# Patient Record
(demographics unavailable — no encounter records)

---

## 2024-10-31 NOTE — ASSESSMENT
[FreeTextEntry1] : 79 year old male who presents for a follow-up visit in our pancreas cyst clinic via telehealth.   His PMH includes colon cancer s/p right hemicolectomy Oct 2023, knee replacements, TAVR 7/2023, HTN, CHELSI.  Pancreas cyst incidentally found on CT abdomen in 2023.    Surveillance MRI/MRCP 9/7/24 showing a 1.3 cm pancreatic tail cyst; a 0.7 cm pancreatic body/tail cyst; and a 1.2 cm pancreatic head cyst. 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 The pancreatic duct is within normal limits.  He offers no GI complaints.  Denies abdominal pain or unintentional weight loss. Reports family history of sister with pancreas cancer (age 60's) and mother with lung ca (smoking hx).   Reviewed cyst pathophysiology with the patient.  Also reviewed [facility] Pancreas Cyst [facility] with the patient.  We will continue to follow him in our program for scheduled ongoing surveillance. Plan for repeat MRI in 1 year to assess stability and follow-up in cyst clinic after MRI. He is interested in having genetic testing due to family hx of sister with pancreas cancer. Will make referral.    I discussed symptoms that would raise my concern for malignant transformation including unintended weight loss, jaundice, abdominal/ back pain, and new onset or worsening diabetes. Should these develop, He should call immediately.   Patient verbalized understanding and agrees to plan.   Today, I personally spent 25 minutes in total time including reviewing imaging and studies, discussing treatment regimens, direct face to face time with the patient, patient education and counseling.

## 2024-10-31 NOTE — HISTORY OF PRESENT ILLNESS
[de-identified] : This visit was provided via telehealth using real-time 2-way audio visual technology. The patient, ANGELA CARTER, was located at home [address] [place] [address] at the time of the visit. This provider was located at the clinic in [place], [place] at the time of the visit. The provider, patient participated in the telehealth encounter.  Verbal consent was given Oct 31 2024  2:15PM by the patient.  Mr. ANGELA CARTER is a 79 year old male who presents for a follow-up visit in our pancreas cyst clinic via telehealth.   His PMH includes colon cancer s/p right hemicolectomy Oct 2023, knee replacements, TAVR 7/2023, HTN, CHELSI.  Pancreas cyst incidentally found on CT abdomen in 2023.    He now presents after one year interval surveillance MRI/MRCP performed 9/7/24 with noted findings- A 1.3 cm pancreatic tail cys; a 0.7 cm pancreatic body/tail cyst; and a 1.2 cm pancreatic head cyst. 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 The pancreatic duct is within normal limits. No evidence for pancreatitis.   He offers no GI complaints.  Denies abdominal pain or unintentional weight loss.   Reports family history of sister with pancreas cancer (age 60's) and mother with lung ca (smoking hx).  No genetic testing of patient or family.

## 2024-10-31 NOTE — PHYSICAL EXAM
[FreeTextEntry1] : Physical exam not performed during Telehealth visits.   [Normal] : oriented to person, place and time, with appropriate affect

## 2024-11-27 NOTE — DISCUSSION/SUMMARY
[FreeTextEntry1] : The visit was provided via telehealth using real-time 2-way audio visual technology. The patient, Bereket Hernandez, was located at home in [place], [place] at the time of the visit. The Genetic Counselor, Alicia Hoffman, was located at the medical office located in [place], [place]. The patient and the Genetic Counselor both participated in the telehealth encounter. [employer] student Ana Porter also participated in the session. Consent for telehealth services was given on 11/27/2024 by the patient, Bereket Hernandez.   REASON FOR CONSULT Bereket Hernandez is a 79-year-old male who was referred by Mckenzie Lentz NP for cancer genetic counseling and risk assessment due to a personal and family history of cancer.  RELEVANT MEDICAL HISTORY Mr. Hernandez was diagnosed with colon cancer in October 2023 at age 78. 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 We do not have records that demonstrate any completed MLH1 promoter methylation studies and/or BRAF mutational analysis at this time, but we are working to obtain those records from [facility] for further assessment. He was treated with a right hemicolectomy alone.  OTHER MEDICAL AND SURGICAL HISTORY: -	Medical history: avascular necrosis of bone, pancreas cyst, knee effusions, renal cysts, hypertension, benign prostatic hyperplasia, GERD -	Surgical history: knee replacement, aortic valve replacement, right hemicolectomy.  CANCER SCREENING HISTORY:   Prostate: -	PSA: annual; most recent reported about 6 months ago. Reported negative history.  Colon:  -	Colonoscopy on 6/28/2023; diverticulosis, colon polyp with pathology revealing invasive moderately differentiated adenocarcinoma. o	1-year follow-up colonoscopy scheduled on 12/3/2024 -	Abdominal MRI on 8/22/2023 d/t left renal cystic lesion seen on outside CT: detected no suspicious renal lesions. Bilateral renal cysts. A left renal cyst measures 2.0 cm with a thin septation -	Endoscopy: reported one done about 3 years ago d/t shortness of breath. Skin:   -	FBSE: annual exams reported; negative history reported. Other:  -	MRCP done on 9/7/2024; 1.3 cm pancreatic tail cyst, 0.7 cm pancreatic body/tail cyst, and a 1.2 cm pancreatic head cyst. Lesions are stable compared to the prior MRI exam and most likely a small side branch IMPN. Pancreatic duct within normal limits.   SOCIAL HISTORY: -	Tobacco-product use: occasional cigar use -	Second-hand smoke exposure: None  FAMILY HISTORY: Maternal and paternal ancestry were both reported as [place]. A detailed family history of cancer was ascertained. Relevant diagnoses are detailed below and in the scanned pedigree.   To Mr. Hernandez's knowledge, no one in the family has had germline testing for cancer susceptibility.    RISK ASSESSMENT: Mr. Hernandez's personal history of a colon cancer with loss of MLH1 and PMS2 at age 78 and his family history of his sister's pancreatic cancer at age 68 is possibly suggestive of an inherited predisposition to Hicks Syndrome and related cancers, although we discussed that without MLH1 methylation studies and/or BRAF mutational analysis, this is not a complete risk assessment. However, given the current information that we have, his recently found pancreatic cysts, his sister's pancreatic cancer, and Mr. Hernandez's strong interest in undergoing genetic testing, we recommended genetic testing for [facility] Custom Next Panel for genes associated with colorectal and pancreatic cancers. This test analyzes 30 genes: APC, LISANDRO, AXIN2, BMPR1A, BRCA1, BRCA2, CDH1, CDKN2A, EPCAM, GREM1, MBD4, MEN1, MLH1, MSH2, MSH3, MSH6, MUTYH, NF1, NTHL1, PALB2, PMS2, POLD1, POLE, PTEN, SMAD4, STK11, TP53, TSC1, TSC2, VHL.  We discussed the risks, benefits and limitations, and implications of genetic testing. We also discussed the psychosocial implications of genetic testing. Possible test results were reviewed with Mr. Hernandez, along with associated medical management options.   Mr. Hernandez verbally consented to the above-mentioned genetic testing panel. Informed consent form was emailed to the patient, and a saliva sample kit will be mailed to the patient. Once the sample has been collected and sent out, Mr. Hernandez will inform us.   PLAN:  1.	Saliva kit was sent to Mr. Hernandez's home for collection. Once collected and dropped off, patient will inform us.  2.	Mr. Hernandez was sent a copy of the informed consent form via email to be filled, signed, and returned to us. 3.	We will contact Mr. Hernandez once the results are available and will schedule a follow-up appointment, as needed. Results generally return in 2-3 weeks from the day the sample is received in the lab.  For any additional questions please call Cancer Genetics at [phone].    Alicia Hoffman MS, [facility] Genetic Counselor, Cancer Genetics   CC: Mckenzie Lentz NP

## 2024-12-10 NOTE — PHYSICAL EXAM
[Left] : left knee [] : no sign of infection [FreeTextEntry3] : KNEE SOFT, NONTENDER. LARGE EFFUSION. NO SIGNS OF INFECTION.  [FreeTextEntry9] : WAS NOT ASSESSED. [de-identified] : THERE IS A 30 DEGREE EXTENSOR LAG

## 2025-01-30 NOTE — DISCUSSION/SUMMARY
[FreeTextEntry1] : RESULTS TRANSMISSION Bereket Hernandez is a 79-year-old male who was called 1/30/2025 for a discussion regarding his genetic testing results related to hereditary cancer predisposition.   Mr. Hernandez was originally seen at [facility] on 11/27/2025 for hereditary cancer predisposition risk assessment due to a personal and family history of cancer. Mr. Hernandez decided to pursue genetic testing using [facility]'s Custom Next Panel for genes associated with colorectal and pancreatic cancers.  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  No pathogenic (disease-causing) variants or additional VUSs were detected in the following genes: APC, LISANDRO, AXIN2, BMPR1A, BRCA1, BRCA2, CDH1, CDKN2A, EPCAM, GREM1, MBD4, MEN1, MLH1, MSH2, MSH3, MSH6, MUTYH, NF1, NTHL1, PALB2, PMS2, POLD1, POLE, PTEN, SMAD4, STK11, TP53, TSC1, TSC2, VHL.  Of note, MLH1 hypermethylation studies and BRAF mutational analysis were not completed at [facility] in 2023 as per the pathologist on the case, Dr. Isaac Whittaker, given that his [facility] oncologist at the time did not request these additional studies.   RESULTS INTERPRETATION AND ASSESSMENT: At this time the available evidence is insufficient to determine the role of this VUS in disease and the clinical significance of this result is uncertain. Individuals with a pathogenic mutation in STK11 may have an increased risk for Peutz Jeghers Syndrome. It is unknown if the patient has an increased risk for the cancers associated with STK11 at this time.  The detection of this VUS should not currently change the patient's medical management. It is NOT recommended at this time that family members use this result for predictive genetic testing or medical management decisions. With more research, a VUS may be reclassified as either disease-causing or benign. Mr. Hernandez was encouraged to contact us every 2-3 years to enquire about any new information for this variant, or sooner if there are any changes in her personal or family history of cancer.  Such updates could possibly change our risk assessment and recommendations.  We also discussed that, while no clear cause of the patient's personal and family history of cancer was identified, this result, while reassuring, does entirely not rule out a hereditary cancer risk in the patient. It is possible, although unlikely, the patient has a mutation in one of the genes tested that is not detectable by this analysis, or has a mutation in a different gene, either known or unknown. It is also possible there is a hereditary cancer predisposition in the family, but the patient did not inherit it.  Given Mr. Hernandez's personal and current reported family history of cancer, his negative genetic test results, and the available information regarding his tumor IHC staining and follow-up, the following screening guidelines and risk-reducing recommendations were discussed:  COLON: Long-term management and surveillance should be based on Mr. Hernandez's on- or post-treatment protocol as recommended by his oncology team.  PANCREAS: Mr. Hernandez should continue to follow up with the Pancreas Team at their discretion given his history of pancreatic cysts and his family history of pancreatic cancer.  OTHER:  In the absence of other indications, Mr. Hernandez should practice age-appropriate cancer screening of other organ systems as recommended for the general population.  In addition, we discussed Mr. Hernandez's brother and nieces could consider pursuing cancer risk assessment genetic counseling with the option of genetic testing.   PLAN: 1.	See above for recommended management.  2.	Testing of family members based on this VUS is not recommended.  3.	The patient was encouraged to contact us every 2-3 years to check on any changes in interpretation of the VUS, or sooner if there are changes in his personal or family history of cancer. 4.	Patient informed consult note(s) will be available through their [facility] [employer] patient portal and genetic test results will be released via [facility]'s laboratory portal.    For any additional questions please call Cancer Genetics at [phone].    Alicia Hoffman MS, [facility] Genetic Counselor, Cancer Genetics   CC: Mckenzie Lentz NP